# Patient Record
Sex: FEMALE | Race: WHITE | NOT HISPANIC OR LATINO | URBAN - METROPOLITAN AREA
[De-identification: names, ages, dates, MRNs, and addresses within clinical notes are randomized per-mention and may not be internally consistent; named-entity substitution may affect disease eponyms.]

---

## 2020-03-05 PROBLEM — Z00.00 ENCOUNTER FOR PREVENTIVE HEALTH EXAMINATION: Status: ACTIVE | Noted: 2020-03-05

## 2020-03-09 ENCOUNTER — OUTPATIENT (OUTPATIENT)
Dept: OUTPATIENT SERVICES | Facility: HOSPITAL | Age: 55
LOS: 1 days | End: 2020-03-09
Payer: COMMERCIAL

## 2020-03-09 PROCEDURE — 72197 MRI PELVIS W/O & W/DYE: CPT

## 2020-03-09 PROCEDURE — A9585: CPT

## 2020-03-09 PROCEDURE — 72197 MRI PELVIS W/O & W/DYE: CPT | Mod: 26

## 2020-03-22 ENCOUNTER — APPOINTMENT (OUTPATIENT)
Dept: MRI IMAGING | Facility: HOSPITAL | Age: 55
End: 2020-03-22
Payer: COMMERCIAL

## 2022-02-23 ENCOUNTER — NON-APPOINTMENT (OUTPATIENT)
Age: 57
End: 2022-02-23

## 2022-02-25 ENCOUNTER — NON-APPOINTMENT (OUTPATIENT)
Age: 57
End: 2022-02-25

## 2022-02-25 ENCOUNTER — APPOINTMENT (OUTPATIENT)
Dept: OTOLARYNGOLOGY | Facility: CLINIC | Age: 57
End: 2022-02-25
Payer: COMMERCIAL

## 2022-02-25 VITALS
SYSTOLIC BLOOD PRESSURE: 134 MMHG | TEMPERATURE: 97.5 F | BODY MASS INDEX: 20.92 KG/M2 | WEIGHT: 138 LBS | HEIGHT: 68 IN | DIASTOLIC BLOOD PRESSURE: 86 MMHG | HEART RATE: 81 BPM

## 2022-02-25 DIAGNOSIS — R09.89 OTHER SPECIFIED SYMPTOMS AND SIGNS INVOLVING THE CIRCULATORY AND RESPIRATORY SYSTEMS: ICD-10-CM

## 2022-02-25 DIAGNOSIS — H61.23 IMPACTED CERUMEN, BILATERAL: ICD-10-CM

## 2022-02-25 DIAGNOSIS — K21.9 GASTRO-ESOPHAGEAL REFLUX DISEASE W/OUT ESOPHAGITIS: ICD-10-CM

## 2022-02-25 DIAGNOSIS — R19.8 OTHER SPECIFIED SYMPTOMS AND SIGNS INVOLVING THE DIGESTIVE SYSTEM AND ABDOMEN: ICD-10-CM

## 2022-02-25 PROCEDURE — 99205 OFFICE O/P NEW HI 60 MIN: CPT | Mod: 25

## 2022-02-25 PROCEDURE — 69210 REMOVE IMPACTED EAR WAX UNI: CPT

## 2022-02-25 PROCEDURE — 31575 DIAGNOSTIC LARYNGOSCOPY: CPT

## 2022-02-26 PROBLEM — K21.9 LARYNGOPHARYNGEAL REFLUX (LPR): Status: ACTIVE | Noted: 2022-02-26

## 2022-02-26 PROBLEM — R09.89 PHLEGM IN THROAT: Status: ACTIVE | Noted: 2022-02-26

## 2022-02-26 PROBLEM — H61.23 BILATERAL IMPACTED CERUMEN: Status: ACTIVE | Noted: 2022-02-26

## 2022-02-26 PROBLEM — R19.8 GLOBUS SENSATION: Status: ACTIVE | Noted: 2022-02-26

## 2022-02-26 NOTE — HISTORY OF PRESENT ILLNESS
[de-identified] : 57 yo female who presents with concern for throat discomfort and phlegm buildup.  \par \par This has been present 1 year.  Symptoms will come and go.  Typically will last seconds.  Occasionally it will happen after meals and after cardio.  She will feel like a phlegmy feeling.  She will cough this up, a little bit.  90% of the day she is completely asymptomatic from this.  No voice changes.  No breathing issues.  No swallowing issues otherwise.\par \par She was seen by an ENT in the past and she was dx with LPR and was treated with dietary change.  He also noted an nasal ulcer and inflammation and told her to take a sinus rinse, abx, Xclear.  In the interim she had an Upper GI with GI she was dx with mild esophagitis.\par \par Diet:\par + 4 cups of caffeine daily, chocolate, mint, tomato, blueberries, seltzer \par - citrus, alcohol\par 4 glasses of water daily\par +late night eating\par \par

## 2022-02-26 NOTE — PROCEDURE
[FreeTextEntry3] : -\par Cerumen Removal/Ear Cleaning for Otitis Externa\par Pre-operative Diagnosis: bilateral Cerumen Impaction\par Post-operative Diagnosis: Same\par Procedure:  Binocular microscopy with cerumen removal- 57350\par Procedure Details:  \par The patient was placed in the supine position.  The operating microscope was positioned.  I then placed the ear speculum in the EAC.  Cerumen was then removed using a mixture of otologic curettes, and suction.  The TM was noted to be intact. I then performed the procedure of the opposite ear in similar fashion.  The patient tolerated procedure well.\par \par Findings: \par Bilateral Ear Canal - normal\par Bilateral Tympanic Membrane - normal\par \par Recommendations: Debrox\par Complications: None\par \par  [de-identified] : -\par Pre-operative Diagnosis: phlegm production\par Post-operative Diagnosis: LPR\par Anesthesia: Topical - 1 % Lidocaine/Phenylephrine\par Procedure:  Flexible Laryngoscopy\par  \par Procedure Details:  \par The patient was placed in the sitting position.  After decongestant and anesthesia were applied the laryngoscope was passed.  The nasal cavities, nasopharynx, oropharynx, hypopharynx, and larynx were all examined.  Vocal folds were examined during respiration and phonation.  The following findings were noted:\par \par Findings:  \par Nose: Septum is midline, turbinates are normal, nasal airways patent, mucosa normal\par Nasopharynx: Adenoids normal, no masses, eustachian tube normal\par Oropharynx: Pharyngeal walls symmetric and without lesion. Tonsils/fossae symmetric\par Hypopharynx: Hypopharynx and pyriform sinuses without lesion. No masses or asymmetry.  No pooling of secretions.\par Larynx:  Epiglottis and aryepiglottic folds were sharp and crisp bilaterally.  Bilateral false and true vocal folds normal appearance. Bilateral vocal folds fully mobile and symmetric.  Airway was widely patent. +post cricoid edema\par \par Condition: Stable.  Patient tolerated procedure well.\par \par Complications: None\par \par

## 2022-02-26 NOTE — ASSESSMENT
[FreeTextEntry1] : 55 yo female who presents with increased intermittent phlegm production.  Based on history and physical exam, I do believe there is a component of laryngopharyngeal reflux.  At this time I am recommending dietary and behavioral change to reduce acid in the diet, increase hydration, and cough avoidance.  I am also recommending omeprazole as well famotidine trial, however she would like to hold off on this for now.  Follow up 3 mo for repeat eval.  If symptoms persist will add medications.\par \par Of note ears cleaned without issue as well.\par \par - Dietary and behavioral modification to reduce acid reflux, handout given\par - Voice hygiene, increase hydration, sips of water throughout the day, avoid throat clearing\par - fu 3 mo\par

## 2023-11-16 ENCOUNTER — TRANSCRIPTION ENCOUNTER (OUTPATIENT)
Age: 58
End: 2023-11-16